# Patient Record
Sex: MALE | Race: WHITE | Employment: FULL TIME | URBAN - NONMETROPOLITAN AREA
[De-identification: names, ages, dates, MRNs, and addresses within clinical notes are randomized per-mention and may not be internally consistent; named-entity substitution may affect disease eponyms.]

---

## 2018-12-27 ENCOUNTER — OFFICE VISIT (OUTPATIENT)
Dept: FAMILY MEDICINE CLINIC | Age: 23
End: 2018-12-27
Payer: COMMERCIAL

## 2018-12-27 VITALS
BODY MASS INDEX: 24.87 KG/M2 | HEIGHT: 75 IN | DIASTOLIC BLOOD PRESSURE: 68 MMHG | RESPIRATION RATE: 14 BRPM | TEMPERATURE: 98.2 F | HEART RATE: 92 BPM | SYSTOLIC BLOOD PRESSURE: 122 MMHG | WEIGHT: 200 LBS

## 2018-12-27 DIAGNOSIS — R07.89 STERNAL PAIN: Primary | ICD-10-CM

## 2018-12-27 PROCEDURE — 99213 OFFICE O/P EST LOW 20 MIN: CPT | Performed by: FAMILY MEDICINE

## 2018-12-27 RX ORDER — PREDNISONE 10 MG/1
TABLET ORAL
Qty: 30 TABLET | Refills: 0 | Status: SHIPPED | OUTPATIENT
Start: 2018-12-27 | End: 2021-06-15

## 2018-12-27 ASSESSMENT — PATIENT HEALTH QUESTIONNAIRE - PHQ9
SUM OF ALL RESPONSES TO PHQ QUESTIONS 1-9: 0
SUM OF ALL RESPONSES TO PHQ9 QUESTIONS 1 & 2: 0
2. FEELING DOWN, DEPRESSED OR HOPELESS: 0
SUM OF ALL RESPONSES TO PHQ QUESTIONS 1-9: 0
1. LITTLE INTEREST OR PLEASURE IN DOING THINGS: 0

## 2018-12-27 NOTE — PROGRESS NOTES
lymphadenopathy  Pulmonary/Chest: clear to auscultation bilaterally- no wheezes, rales or rhonchi, normal air movement, no respiratory distress or retractions  Cardiovascular: normal rate, regular rhythm, normal S1 and S2, no murmurs, rubs, clicks, or gallops, distal pulses intact  Extremities: no cyanosis, clubbing or edema of the lower extremities  Psych:  Normal affect without evidence of depression oranxiety, insight and judgement are appropriate, memory appears intact  Skin: warm and dry, no rash or erythema    Physical Exam   Musculoskeletal:        Arms:      ASSESSMENT & PLAN  Yaw Escobar was seen today for other and chest pain. Diagnoses and all orders for this visit:    Sternal pain  -     predniSONE (DELTASONE) 10 MG tablet; Take 4 tabs PO x 3 days, then take 3 tabs PO x 3 days, then take 2 tabs PO x 3 days, then take 1 tab PO x 3 days  -     XR CHEST STANDARD (2 VW); Future    -Unclear etiology of the symptoms, will obtain XRay and treat as costochrondritis with prednisone. Will call in 2 weeks. If persisting, ?ortho referral.      Return if symptoms worsen or fail to improve. Controlled Substances Monitoring:                     Yaw Escobar received counseling on the following healthy behaviors: medication adherence  Reviewed prior labs and health maintenance. Continue current medications, diet and exercise. Discussed use, benefit, and side effects of prescribed medications. Barriers to medication compliance addressed. Patient given educational materials - see patient instructions. All patient questions answered. Patient voiced understanding.

## 2019-01-07 ENCOUNTER — TELEPHONE (OUTPATIENT)
Dept: FAMILY MEDICINE CLINIC | Age: 24
End: 2019-01-07

## 2019-01-07 ENCOUNTER — PATIENT MESSAGE (OUTPATIENT)
Dept: FAMILY MEDICINE CLINIC | Age: 24
End: 2019-01-07

## 2021-05-24 ENCOUNTER — OFFICE VISIT (OUTPATIENT)
Dept: FAMILY MEDICINE CLINIC | Age: 26
End: 2021-05-24
Payer: COMMERCIAL

## 2021-05-24 VITALS
TEMPERATURE: 97.3 F | HEART RATE: 78 BPM | DIASTOLIC BLOOD PRESSURE: 80 MMHG | BODY MASS INDEX: 24.87 KG/M2 | WEIGHT: 200 LBS | OXYGEN SATURATION: 98 % | RESPIRATION RATE: 18 BRPM | HEIGHT: 75 IN | SYSTOLIC BLOOD PRESSURE: 120 MMHG

## 2021-05-24 DIAGNOSIS — B07.8 OTHER VIRAL WARTS: ICD-10-CM

## 2021-05-24 DIAGNOSIS — N50.89 MASS OF RIGHT TESTICLE: Primary | ICD-10-CM

## 2021-05-24 PROCEDURE — G8419 CALC BMI OUT NRM PARAM NOF/U: HCPCS | Performed by: FAMILY MEDICINE

## 2021-05-24 PROCEDURE — G8427 DOCREV CUR MEDS BY ELIG CLIN: HCPCS | Performed by: FAMILY MEDICINE

## 2021-05-24 PROCEDURE — 99213 OFFICE O/P EST LOW 20 MIN: CPT | Performed by: FAMILY MEDICINE

## 2021-05-24 PROCEDURE — 17110 DESTRUCTION B9 LES UP TO 14: CPT | Performed by: FAMILY MEDICINE

## 2021-05-24 PROCEDURE — 1036F TOBACCO NON-USER: CPT | Performed by: FAMILY MEDICINE

## 2021-05-24 SDOH — ECONOMIC STABILITY: FOOD INSECURITY: WITHIN THE PAST 12 MONTHS, THE FOOD YOU BOUGHT JUST DIDN'T LAST AND YOU DIDN'T HAVE MONEY TO GET MORE.: NEVER TRUE

## 2021-05-24 SDOH — ECONOMIC STABILITY: FOOD INSECURITY: WITHIN THE PAST 12 MONTHS, YOU WORRIED THAT YOUR FOOD WOULD RUN OUT BEFORE YOU GOT MONEY TO BUY MORE.: NEVER TRUE

## 2021-05-24 ASSESSMENT — PATIENT HEALTH QUESTIONNAIRE - PHQ9
SUM OF ALL RESPONSES TO PHQ9 QUESTIONS 1 & 2: 0
SUM OF ALL RESPONSES TO PHQ QUESTIONS 1-9: 0
2. FEELING DOWN, DEPRESSED OR HOPELESS: 0
1. LITTLE INTEREST OR PLEASURE IN DOING THINGS: 0

## 2021-05-24 ASSESSMENT — SOCIAL DETERMINANTS OF HEALTH (SDOH): HOW HARD IS IT FOR YOU TO PAY FOR THE VERY BASICS LIKE FOOD, HOUSING, MEDICAL CARE, AND HEATING?: NOT HARD AT ALL

## 2021-05-24 NOTE — PROGRESS NOTES
Saba Theodore is a 22 y.o. male that presents for     Chief Complaint   Patient presents with    Mass     on rt testicle      Other     rt hand thimmb thick hard skin        /80   Pulse 78   Temp 97.3 °F (36.3 °C)   Resp 18   Ht 6' 3\" (1.905 m)   Wt 200 lb (90.7 kg)   SpO2 98%   BMI 25.00 kg/m²       HPI    Notes right testicular mass for the past month. No trauma. Has not noted any change in size. Non tender. No dysuria, pain with ejaculation. Skin Lesion    HPI:    Location - right medial thumb  Length of time it has been present - at least 1 year  Recent change in size, color or shape? - Yes - has gotten larger  Pruritic? No  Bleeding or drainage? No  Painful? Yes      Health Maintenance   Topic Date Due    Hepatitis C screen  Never done    Varicella vaccine (1 of 2 - 2-dose childhood series) Never done    HPV vaccine (1 - Male 2-dose series) Never done    COVID-19 Vaccine (1) Never done    HIV screen  Never done    DTaP/Tdap/Td vaccine (1 - Tdap) Never done    Flu vaccine (Season Ended) 09/01/2021    Hepatitis A vaccine  Aged Out    Hepatitis B vaccine  Aged Out    Hib vaccine  Aged Out    Meningococcal (ACWY) vaccine  Aged Out    Pneumococcal 0-64 years Vaccine  Aged Out       No past medical history on file. No past surgical history on file. Social History     Tobacco Use    Smoking status: Never Smoker    Smokeless tobacco: Never Used   Substance Use Topics    Alcohol use: No    Drug use: Not on file       No family history on file. I have reviewed the patient's past medical history, past surgical history, allergies, medications, social and family history and I have made updates where appropriate. Review of Systems        PHYSICAL EXAM:  /80   Pulse 78   Temp 97.3 °F (36.3 °C)   Resp 18   Ht 6' 3\" (1.905 m)   Wt 200 lb (90.7 kg)   SpO2 98%   BMI 25.00 kg/m²     Physical Exam  Nursing note reviewed.    Constitutional:       Appearance: He is well-developed. Pulmonary:      Effort: Pulmonary effort is normal. No respiratory distress. Genitourinary:     Testes:         Right: Mass (firm mass on the inferior right testicle, non tender) present. Skin:     Findings: Lesion (small ~5mm mass on the right medial thumb consistent with a cyst) present. Neurological:      Mental Status: He is alert. Psychiatric:         Behavior: Behavior normal.         Thought Content: Thought content normal.         Judgment: Judgment normal.       Wart Cryotherapy Note    Location:  Right thumb  Size: ~5mm    Written consent was obtain from patient or their guargian prior to procedure. After obtaining informed consent, the patient's identity, procedure, and site were verified during a pause prior to proceeding with the minor surgical procedure as per universal protocol recommendations. Wart was treated with light cryotherapy with freeze thaw freeze technique with 2-3 mm surround freeze of overlying keratin. Local care discussed. Side effects of treatment and precautions discussed. All questions answered. To follow up if worse or any new problems. Garett Melchor was seen today for mass and other. Diagnoses and all orders for this visit:    Mass of right testicle  -     US SCROTUM AND TESTICLES; Future        Return if symptoms worsen or fail to improve. The most recent results of the following tests were reviewed with the patient today: none     All copied or forwarded information in the progress note was verified by me to be accurate at the time of visit  Patient's past medical, surgical, social and family history were reviewed and updated     All patient questions answered. Patient voiced understanding.

## 2021-05-27 ENCOUNTER — HOSPITAL ENCOUNTER (OUTPATIENT)
Dept: ULTRASOUND IMAGING | Age: 26
Discharge: HOME | End: 2021-05-27
Payer: COMMERCIAL

## 2021-05-27 DIAGNOSIS — N50.89 MASS OF RIGHT TESTICLE: ICD-10-CM

## 2021-05-27 PROCEDURE — 76870 US EXAM SCROTUM: CPT

## 2021-05-28 DIAGNOSIS — N50.89 EPIDIDYMAL MASS: Primary | ICD-10-CM

## 2021-06-15 ENCOUNTER — OFFICE VISIT (OUTPATIENT)
Dept: UROLOGY | Age: 26
End: 2021-06-15
Payer: COMMERCIAL

## 2021-06-15 VITALS
BODY MASS INDEX: 24.92 KG/M2 | SYSTOLIC BLOOD PRESSURE: 124 MMHG | HEIGHT: 73 IN | DIASTOLIC BLOOD PRESSURE: 70 MMHG | WEIGHT: 188 LBS

## 2021-06-15 DIAGNOSIS — I86.1 LEFT VARICOCELE: Primary | ICD-10-CM

## 2021-06-15 DIAGNOSIS — N50.3 EPIDIDYMAL CYST: ICD-10-CM

## 2021-06-15 PROCEDURE — 99204 OFFICE O/P NEW MOD 45 MIN: CPT | Performed by: UROLOGY

## 2021-06-15 PROCEDURE — G8427 DOCREV CUR MEDS BY ELIG CLIN: HCPCS | Performed by: UROLOGY

## 2021-06-15 PROCEDURE — 4004F PT TOBACCO SCREEN RCVD TLK: CPT | Performed by: UROLOGY

## 2021-06-15 PROCEDURE — G8420 CALC BMI NORM PARAMETERS: HCPCS | Performed by: UROLOGY

## 2021-06-15 NOTE — PROGRESS NOTES
VALERIA Earl MD        [address] [place] [facility] [address]  Dept: [phone]  Dept Fax: [phone]: [address] [address] Urology Office Note -     Patient:  Ann Weir  YOB: 1995    The patient is a 32 y.o. male who presents today for evaluation of the following problems:   Chief Complaint   Patient presents with    Advice Only     new pt epidiymal cyst    referred/consultation requested by Roosevelt Baeza DO.    HISTORY OF PRESENT ILLNESS:     Varicocele  Left side  Painful  Atrophy of testes  Here to discuss options  Reviewed u/s      Requested/reviewed records from Roosevelt Baeza DO office and/or outside physician/EMR    (Patient's old records have been requested, reviewed and pertinent findings summarized in today's note.)    Procedures Today: N/A      Last several PSA's:  No results found for: PSA    Last total testosterone:  No results found for: TESTOSTERONE    Urinalysis today:  No results found for this visit on 06/15/21. Last BUN and creatinine:  No results found for: BUN  No results found for: CREATININE      Imaging Reviewed during this Office Visit:   Dian Mcmillan MD independently reviewed the images and verified the radiology reports from:    [address]    Result Date: 5/28/2021  PROCEDURE: US SCROTUM AND TESTICLES CLINICAL INFORMATION: Mass of right testicle . COMPARISON: No prior study. TECHNIQUE: Grayscale and color Doppler sonographic imaging of the scrotum was performed in the longitudinal and transverse planes. FINDINGS: Findings Right Testicle- 4.3 x 2.4 x 1.9 cm Right Epididymis- 0.8 x 1.7 x 1.2 cm Left Testicle - 3.7 x 2.1 x 1.8 cm Left Epididymis - 1.1 x 1.5 x 1.5 cm On the right side, the testis has normal echogenicity There is no testicular mass. There is normal color flow and Doppler waveforms. There is no hydrocele.  There is a 7.4 x 7.3 cm echogenic lesion in the body of the epididymis which represents the palpable abnormality. This may be secondary to chronic inflammatory disease. . On the left, the testicle has normal echogenicity. There is no testicular mass. There is normal color flow and Doppler waveforms. There is a varicocele present. The epididymis is normal.      1. Normal right and left testes. 2. 7.4 x 7.3 mm echogenic lesion in the body of the epididymis which represents the palpable abnormality. This may be secondary to chronic inflammatory disease. 3. Varicocele on the left side. 4. Otherwise negative ultrasound scan of the scrotum. **This report has been created using voice recognition software. It may contain minor errors which are inherent in voice recognition technology. ** Final report electronically signed by DR Stefani Davis on 5/28/2021 1:57 PM      PAST MEDICAL, FAMILY AND SOCIAL HISTORY:  History reviewed. No pertinent past medical history. Past Surgical History:   Procedure Laterality Date    WISDOM TOOTH EXTRACTION      as teenager      History reviewed. No pertinent family history. No outpatient medications have been marked as taking for the 6/15/21 encounter (Office Visit) with Nato Katz MD.       Milk-related compounds  Social History     Tobacco Use   Smoking Status Current Some Day Smoker    Types: Cigars   Smokeless Tobacco Never Used   Tobacco Comment    social      (If patient a smoker, smoking cessation counseling offered)   Social History     Substance and Sexual Activity   Alcohol Use No       REVIEW OF SYSTEMS:  Constitutional: negative  Eyes: negative  Respiratory: negative  Cardiovascular: negative  Gastrointestinal: negative  Genitourinary: see HPI  Musculoskeletal: negative  Skin: negative   Neurological: negative  Hematological/Lymphatic: negative  Psychological: negative      Physical Exam:    This a 32 y.o. male  Vitals:    06/15/21 1551   BP: 124/70     Body mass index is 24.8 kg/m².   Constitutional: Patient in no acute distress; Neuro: alert and oriented to person place and time. Psych: Mood and affect normal.  Skin: warm, dry  Lungs: Respiratory effort normal, Symmetric chest rise  Cardiovascular:  Normal peripheral pulses; Regular rate, radial pulses palpable  Abdomen: Soft, non-tender, non-distended with no CVA, flank pain, hepatosplenomegaly or hernia. Kidneys normal.  Bladder non-tender and not distended. Lymphatics: no palpable lymphadenopathy  Minimal/no edema in bilateral lower extremities  Large left varicocele w atrophy  Right epididymal cyst    Assessment and Plan        1. Left varicocele    2. Epididymal cyst               Plan:        Large left varicocele w atrophy  Right epididymal cyst  Needs varicocelectomy    Prescriptions Ordered:  No orders of the defined types were placed in this encounter. Orders Placed:  No orders of the defined types were placed in this encounter.            Yosef Hutchinson MD

## 2021-07-15 ENCOUNTER — OFFICE VISIT (OUTPATIENT)
Dept: UROLOGY | Age: 26
End: 2021-07-15
Payer: COMMERCIAL

## 2021-07-15 ENCOUNTER — TELEPHONE (OUTPATIENT)
Dept: UROLOGY | Age: 26
End: 2021-07-15

## 2021-07-15 VITALS — WEIGHT: 185 LBS | BODY MASS INDEX: 24.52 KG/M2 | HEIGHT: 73 IN

## 2021-07-15 DIAGNOSIS — I86.1 VARICOCELE: Primary | ICD-10-CM

## 2021-07-15 DIAGNOSIS — Z01.818 PRE-OP TESTING: ICD-10-CM

## 2021-07-15 PROCEDURE — G8420 CALC BMI NORM PARAMETERS: HCPCS | Performed by: UROLOGY

## 2021-07-15 PROCEDURE — 4004F PT TOBACCO SCREEN RCVD TLK: CPT | Performed by: UROLOGY

## 2021-07-15 PROCEDURE — G8427 DOCREV CUR MEDS BY ELIG CLIN: HCPCS | Performed by: UROLOGY

## 2021-07-15 PROCEDURE — 99214 OFFICE O/P EST MOD 30 MIN: CPT | Performed by: UROLOGY

## 2021-07-15 NOTE — TELEPHONE ENCOUNTER
DO NOT TAKE ASPIRIN, PLAVIX, FISH OIL, COUMADIN, IBUPROFEN, MOTRIN-LIKE DRUGS AND ANY MULTIVITAMINS OR OVER THE COUNTER SUPPLEMENTS 5 DAYS PRIOR TO SURGERY. Ann Weir 1995 Diagnosis:     Surgical Physician: Dr. Erasto Ritchie have been scheduled for the procedure marked below:      Surgery: Left Varicocelectomy         Date:  11/23/21    Anesthesia: Anesthesiologist (General/Spinal)     Place of Service: [address] [place] [address] Second Floor Same Day Surgery         Arrive to same day surgery by:  6:00 am  (Surgery time is subject to change)      INSTRUCTIONS AS MARKED BELOW:    1.  DO NOT eat or drink anything after midnight before surgery. 2.  We prefer you shower or bathe with an antibacterial soap (Dial) the morning of surgery. 3.  Please ensure to have a  with you to transport you home. 4.  Please bring a current medication list, photo ID and insurance card(s) with you  5. Okay to take Tylenol  6. If you take Glucophage, Metformin or Janumet, hold 48-hours prior to surgery  7. Take blood pressure or heart medication as directed, if taken in the morning take with a small sip of water  8 . The office will call you in 1-2 days after your procedure to schedule a follow up. DATE SENSITIVE TESTING    Do the pre op urine culture, chest xray and fasting labs on 11/9/21.  Orders included        Date: 7/15/2021

## 2021-07-15 NOTE — PROGRESS NOTES
Vanessa Newman MD   Urology Clinic Office visit      Patient:  Ashtyn Worrell  YOB: 1995  Date: 7/15/2021    HISTORY OF PRESENT ILLNESS:   The patient is a 32 y.o. male who presents today for follow-up for the following problem(s):      1. Varicocele           Overall the problem(s) : are worsening. Associated Symptoms: No dysuria, gross hematuria. Pain Severity:      Summary of old records: N/A    Additional History:   Patient interested in repair of varicocele; he is concerned for possibly fertility issues in future  Denies pain  Right sided spermatocele noted few months ago on self exam, noted on US    I independently reviewed and verified the images and reports from:    [address]    Result Date: 5/28/2021  PROCEDURE: US SCROTUM AND TESTICLES CLINICAL INFORMATION: Mass of right testicle . COMPARISON: No prior study. TECHNIQUE: Grayscale and color Doppler sonographic imaging of the scrotum was performed in the longitudinal and transverse planes. FINDINGS: Findings Right Testicle- 4.3 x 2.4 x 1.9 cm Right Epididymis- 0.8 x 1.7 x 1.2 cm Left Testicle - 3.7 x 2.1 x 1.8 cm Left Epididymis - 1.1 x 1.5 x 1.5 cm On the right side, the testis has normal echogenicity There is no testicular mass. There is normal color flow and Doppler waveforms. There is no hydrocele. There is a 7.4 x 7.3 cm echogenic lesion in the body of the epididymis which represents the palpable abnormality. This may be secondary to chronic inflammatory disease. . On the left, the testicle has normal echogenicity. There is no testicular mass. There is normal color flow and Doppler waveforms. There is a varicocele present. The epididymis is normal.      1. Normal right and left testes. 2. 7.4 x 7.3 mm echogenic lesion in the body of the epididymis which represents the palpable abnormality. This may be secondary to chronic inflammatory disease. 3. Varicocele on the left side.  4. Otherwise negative ultrasound scan of the scrotum. **This report has been created using voice recognition software. It may contain minor errors which are inherent in voice recognition technology. ** Final report electronically signed by DR Maura Arizmendi on 5/28/2021 1:57 PM        Last several PSA's:  No results found for: PSA    Last total testosterone:  No results found for: TESTOSTERONE    Urinalysis today:  No results found for this visit on 07/15/21. Last BUN and creatinine:  No results found for: BUN  No results found for: CREATININE    Imaging Reviewed during this Office Visit:   (results were independently reviewed by physician and radiology report verified)    [address]:  No past medical history on file. Past Surgical History:   Procedure Laterality Date    WISDOM TOOTH EXTRACTION      as teenager      No family history on file. No outpatient medications have been marked as taking for the 7/15/21 encounter (Office Visit) with Chris Schuler MD.       Milk-related compounds  Social History     Tobacco Use   Smoking Status Current Some Day Smoker    Types: Cigars   Smokeless Tobacco Never Used   Tobacco Comment    social       Social History     Substance and Sexual Activity   Alcohol Use No       REVIEW OF SYSTEMS:  Constitutional: negative  Eyes: negative  Respiratory: negative  Cardiovascular: negative  Gastrointestinal: negative  Musculoskeletal: negative  Genitourinary: negative except for what is in HPI  Skin: negative   Neurological: negative  Hematological/Lymphatic: negative  Psychological: negative    Physical Exam:    There were no vitals filed for this visit. Patient is a 32 y.o. male in no acute distress and alert and oriented to person, place and time. NAD, A/o  Non labored respiration  Normal peripheral pulses  Skin- warm and dry  Psych- normal mood and affect    Left grade 3 varicocele    Assessment and Plan      1.  Varicocele          Risks: I discussed all the risks, benefits, alternatives and possible complications or surgery as well as expectations and post-op recovery.     Nasim Evans M.D, MD Gayle Barman, M.D [facility]

## 2021-07-16 ENCOUNTER — TELEPHONE (OUTPATIENT)
Dept: UROLOGY | Age: 26
End: 2021-07-16

## 2021-07-16 NOTE — TELEPHONE ENCOUNTER
Patient scheduled for surgery with Dr Irma Awan on 8/12/21. Surgery consent on arrival. Patient to do pre op urine culture,fasting labs and chest xray on 7/29/21. Surgery instructions given to the patient.

## 2021-07-16 NOTE — TELEPHONE ENCOUNTER
SURGERY [address]  [address] [place], [place] [address] Drive [facility], [address]      Phone *[phone] *[phone]   Surgical Scheduling Direct Line Phone *[phone] Fax *[phone]      Toshia rEwin 1995 male    [address] [facility] [address]   Marital Status: Single         Home Phone: [phone]      Cell Phone:    Telephone Information:   Mobile [phone]          Surgeon: Dr. Michelle Duran  Surgery Date: 11/23/21   Time: 7:30 am    Procedure: Left Varicocelectomy    Diagnosis: Left Varicocele     Important Medical History: In [facility]    Special Inst/Equip: Regular Room    CPT Codes:    36368  Latex Allergy:  No     Cardiac Device:  No     Anesthesia:  Anesthesiologist (General/Spinal)          Admission Type:  Same Day                             Admit Prior to Day of Surgery: No    Case Location:  Main OR           Preadmission Testing:Phone Call              PAT Date and Time:______________________________________________________    PAT Confirmation #: ______________________________________________________    Post Op Visit: ___________________________________________________________    Need Preop Cardiac Clearance: No    Does Patient have Cardiologist/physician?      None    Surgery Confirmation #: __________________________________________________    : ________________________   Date: __________________________     [employer] Name: [facility]

## 2021-07-16 NOTE — TELEPHONE ENCOUNTER
No pre cert needed for CPT 90340 with Dx Code I86.1 per [facility] with [facility]   Ref # [id number]

## 2021-10-07 ENCOUNTER — TELEPHONE (OUTPATIENT)
Dept: UROLOGY | Age: 26
End: 2021-10-07

## 2021-10-07 DIAGNOSIS — I86.1 VARICOCELE: Primary | ICD-10-CM

## 2021-10-07 DIAGNOSIS — Z01.818 PRE-OP TESTING: ICD-10-CM

## 2021-10-07 NOTE — TELEPHONE ENCOUNTER
SURGERY [address] [place] [address] [facility] [address] [place], [address]                            Phone *[phone] *[phone]              Surgical Scheduling Direct Line Phone *[phone] Fax *[phone]        Ed Rahman        1995          male     [address]  [address] [address]              Marital Status: Single                                                     Home Phone: [phone]      Cell Phone:    Telephone Information:   Mobile [phone]                                            Surgeon: Dr. Brandon Sauceda            Surgery Date:     12/9/21                  Time: 11:00 am     Procedure: Left Varicocelectomy     Diagnosis: Left Varicocele      Important Medical History: In [facility]     Special Inst/Equip: Regular Room     CPT Codes:    70512  Latex Allergy:  No     Cardiac Device:  No      Anesthesia:    Anesthesiologist (General/Spinal)                       Admission Type:  Same Day                             Admit Prior to Day of Surgery: No     Case Location:  Main OR                Preadmission Testing:Phone Call                           PAT Date and Time:______________________________________________________     PAT Confirmation #: ______________________________________________________     Post Op Visit: ___________________________________________________________     Need Preop Cardiac Clearance: No     Does Patient have Cardiologist/physician?      None     Surgery Confirmation #: __________________________________________________     : ________________________   Date: __________________________      Insurance Company Name: [facility]

## 2021-10-07 NOTE — TELEPHONE ENCOUNTER
Patient's surgery rescheduled to 12/9/21 with Dr Alice Bhandari. Surgery consent on arrival. Patient to do pre op urine culture, fasting labs and chest xray on 11/24/21. Surgery instructions mailed to the patient.

## 2021-10-07 NOTE — TELEPHONE ENCOUNTER
DO NOT TAKE ASPIRIN, PLAVIX, FISH OIL, COUMADIN, IBUPROFEN, MOTRIN-LIKE DRUGS AND ANY MULTIVITAMINS OR OVER THE COUNTER SUPPLEMENTS 5 DAYS PRIOR TO SURGERY.                Consuelo Rojas        1995          Diagnosis:      Surgical Physician: Dr. Harini Connell have been scheduled for the procedure marked below:                  Surgery: Left Varicocelectomy                                          Date:   12/9/21     Anesthesia: Anesthesiologist ([address] [place] [address])               Place of Service: [facility] Second Floor Same Day Surgery                                                                          Arrive to same day surgery by: 9:00 am  (Surgery time is subject to change)                             INSTRUCTIONS AS MARKED BELOW:     1.  DO NOT eat or drink anything after midnight before surgery. 2.  We prefer you shower or bathe with an antibacterial soap (Dial) the morning of surgery. 3.  Please ensure to have a  with you to transport you home. 4.  Please bring a current medication list, photo ID and insurance card(s) with you  5. Okay to take Tylenol  6. If you take Glucophage, Metformin or Janumet, hold 48-hours prior to surgery  7. Take blood pressure or heart medication as directed, if taken in the morning take with a small sip of water  8 . The office will call you in 1-2 days after your procedure to schedule a follow up.                                         DATE SENSITIVE TESTING     Do the pre op urine culture, chest xray and fasting labs on 11/24/21.  Orders included

## 2021-10-26 ENCOUNTER — TELEPHONE (OUTPATIENT)
Dept: UROLOGY | Age: 26
End: 2021-10-26

## 2021-11-04 NOTE — TELEPHONE ENCOUNTER
DO NOT TAKE ASPIRIN, PLAVIX, FISH OIL, COUMADIN, IBUPROFEN, MOTRIN-LIKE DRUGS AND ANY MULTIVITAMINS OR OVER THE COUNTER SUPPLEMENTS 5 DAYS PRIOR TO SURGERY.                Yulia House        1995          Diagnosis:      Surgical Physician: Dr. Leroy Aaron have been scheduled for the procedure marked below:                  Surgery: Left Varicocelectomy                                          Date:   1/13/2022     Anesthesia: Anesthesiologist ([facility])               Place of Service: [address] [place] [address] [address] Second Floor Same Day Surgery                                                                            Arrive to same day surgery by:  6:00 am  (Surgery time is subject to change)                             INSTRUCTIONS AS MARKED BELOW:     1.  DO NOT eat or drink anything after midnight before surgery. 2.  We prefer you shower or bathe with an antibacterial soap (Dial) the morning of surgery. 3.  Please ensure to have a  with you to transport you home. 4.  Please bring a current medication list, photo ID and insurance card(s) with you  5. Okay to take Tylenol  6. If you take Glucophage, Metformin or Janumet, hold 48-hours prior to surgery  7. Take blood pressure or heart medication as directed, if taken in the morning take with a small sip of water  8 . The office will call you in 1-2 days after your procedure to schedule a follow up.                                         DATE SENSITIVE TESTING     Do the pre op urine culture, chest xray and fasting labs on 12/30/21.  Orders given

## 2021-11-04 NOTE — TELEPHONE ENCOUNTER
SURGERY [address] [facility] [address] [facility] [address] [place], [address]                            Phone *[phone] *[phone]              Surgical Scheduling Direct Line Phone *[phone] Fax *[phone]        Jalyn De Jesus        1995          male     [address]  [address] [facility]              Marital Status: Single                                                     Home Phone: [phone]      Cell Phone:    Telephone Information:   Mobile [phone]                                            Surgeon: Dr. Elizabeth Hull            Surgery Date: 1/13/2022                      Time: 7:30 am     Procedure: Left Varicocelectomy     Diagnosis: Left Varicocele      Important Medical History: In [facility]     Special Inst/Equip: Regular Room     CPT Codes:    60868  Latex Allergy:  No     Cardiac Device:  No      Anesthesia:    Anesthesiologist (General/Spinal)                       Admission Type:  Same Day                             Admit Prior to Day of Surgery: No     Case Location:  Main OR                Preadmission Testing:Phone Call                           PAT Date and Time:______________________________________________________     PAT Confirmation #: ______________________________________________________     Post Op Visit: ___________________________________________________________     Need Preop Cardiac Clearance: No     Does Patient have Cardiologist/physician?      None     Surgery Confirmation #: __________________________________________________     : ________________________   Date: __________________________      Insurance Company Name: [facility]

## 2021-12-16 ENCOUNTER — PREP FOR PROCEDURE (OUTPATIENT)
Dept: UROLOGY | Age: 26
End: 2021-12-16

## 2021-12-16 RX ORDER — SODIUM CHLORIDE 9 MG/ML
INJECTION, SOLUTION INTRAVENOUS CONTINUOUS
Status: CANCELLED | OUTPATIENT
Start: 2022-01-13

## 2021-12-30 ENCOUNTER — HOSPITAL ENCOUNTER (OUTPATIENT)
Dept: GENERAL RADIOLOGY | Age: 26
Discharge: HOME | End: 2021-12-30
Payer: COMMERCIAL

## 2021-12-30 ENCOUNTER — HOSPITAL ENCOUNTER (OUTPATIENT)
Age: 26
Discharge: HOME | End: 2021-12-30
Payer: COMMERCIAL

## 2021-12-30 DIAGNOSIS — I86.1 VARICOCELE: ICD-10-CM

## 2021-12-30 DIAGNOSIS — Z01.818 PRE-OP TESTING: ICD-10-CM

## 2021-12-30 LAB
ANION GAP SERPL CALCULATED.3IONS-SCNC: 10 MEQ/L (ref 8–16)
BASOPHILS # BLD: 1.1 %
BASOPHILS ABSOLUTE: 0.1 THOU/MM3 (ref 0–0.1)
BUN BLDV-MCNC: 12 MG/DL (ref 7–22)
CALCIUM SERPL-MCNC: 9.5 MG/DL (ref 8.5–10.5)
CHLORIDE BLD-SCNC: 103 MEQ/L (ref 98–111)
CO2: 26 MEQ/L (ref 23–33)
CREAT SERPL-MCNC: 1.1 MG/DL (ref 0.4–1.2)
EOSINOPHIL # BLD: 15.2 %
EOSINOPHILS ABSOLUTE: 1 THOU/MM3 (ref 0–0.4)
ERYTHROCYTE [DISTWIDTH] IN BLOOD BY AUTOMATED COUNT: 12.2 % (ref 11.5–14.5)
ERYTHROCYTE [DISTWIDTH] IN BLOOD BY AUTOMATED COUNT: 39 FL (ref 35–45)
GFR SERPL CREATININE-BSD FRML MDRD: 81 ML/MIN/1.73M2
GLUCOSE BLD-MCNC: 91 MG/DL (ref 70–108)
HCT VFR BLD CALC: 45.8 % (ref 42–52)
HEMOGLOBIN: 15.5 GM/DL (ref 14–18)
IMMATURE GRANS (ABS): 0.02 THOU/MM3 (ref 0–0.07)
IMMATURE GRANULOCYTES: 0.3 %
LYMPHOCYTES # BLD: 11.5 %
LYMPHOCYTES ABSOLUTE: 0.7 THOU/MM3 (ref 1–4.8)
MCH RBC QN AUTO: 29.6 PG (ref 26–33)
MCHC RBC AUTO-ENTMCNC: 33.8 GM/DL (ref 32.2–35.5)
MCV RBC AUTO: 87.6 FL (ref 80–94)
MONOCYTES # BLD: 9.8 %
MONOCYTES ABSOLUTE: 0.6 THOU/MM3 (ref 0.4–1.3)
NUCLEATED RED BLOOD CELLS: 0 /100 WBC
PLATELET # BLD: 225 THOU/MM3 (ref 130–400)
PMV BLD AUTO: 9.3 FL (ref 9.4–12.4)
POTASSIUM SERPL-SCNC: 4 MEQ/L (ref 3.5–5.2)
RBC # BLD: 5.23 MILL/MM3 (ref 4.7–6.1)
SEG NEUTROPHILS: 62.1 %
SEGMENTED NEUTROPHILS ABSOLUTE COUNT: 4 THOU/MM3 (ref 1.8–7.7)
SODIUM BLD-SCNC: 139 MEQ/L (ref 135–145)
WBC # BLD: 6.5 THOU/MM3 (ref 4.8–10.8)

## 2021-12-30 PROCEDURE — 71046 X-RAY EXAM CHEST 2 VIEWS: CPT

## 2021-12-30 PROCEDURE — 85025 COMPLETE CBC W/AUTO DIFF WBC: CPT

## 2021-12-30 PROCEDURE — 80048 BASIC METABOLIC PNL TOTAL CA: CPT

## 2021-12-30 PROCEDURE — 36415 COLL VENOUS BLD VENIPUNCTURE: CPT

## 2021-12-30 PROCEDURE — 87086 URINE CULTURE/COLONY COUNT: CPT

## 2022-01-01 LAB
ORGANISM: ABNORMAL
URINE CULTURE, ROUTINE: ABNORMAL

## 2022-01-03 ENCOUNTER — TELEPHONE (OUTPATIENT)
Dept: UROLOGY | Age: 27
End: 2022-01-03

## 2022-01-06 NOTE — PROGRESS NOTES
In preparation for their surgical procedure above patient was screened for Obstructive Sleep Apnea (HAMLET) using the STOP-Bang Questionnaire by the Pre-Admission Testing department. This is a pre-surgical screening tool for patient safety and serves as a recommendation, this WILL NOT cause cancellation of surgery. STOP-Bang Questionnaire  * Do you currently see a pulmonologist?  No     If yes STOP, do not complete. Patient follows with  .    1. Do you snore loudly (able to be heard in the next room)? No    2. Do you often feel tired or sleepy during the daytime? No       3. Has anyone ever told you that you stop breathing during your sleep? No    4. Do you have or are you being treated for high blood pressure? No      5. BMI more than 35? BMI (Calculated): 24.5        No    6. Age over 48 years? 32 y.o. No    7. Neck Circumference greater than 17 inches for male or 16 inches for female? Measured           (visits only)            Not Applicable    8. Gender Male? Yes      TOTAL SCORE: 1    HAMLET - Low Risk : Yes to 0 - 2 questions  HAMLET - Intermediate Risk : Yes to 3 - 4 questions  HAMLET - High Risk : Yes to 5 - 8 questions    Adapted from:   STOP Questionnaire: A Tool to Screen Patients for Obstructive Sleep Apnea   SHELLIE Ortiz.P.C., SHAYY Heck.B.B.S., Kieran Thacker M.D., Judi Zurita. Tyson Alonso, Ph.D., SHAYY Chua.B.B.S., Anaya Rossi M.Sc., Pepe Nash M.D., Dhruv Wesley. SHELLIE Costello.P.C.    Anesthesiology 2008; 859:044-73 Copyright 2008, the [address] of [employer]est[employer], [place].   ----------------------------------------------------------------------------------------------------------------

## 2022-01-06 NOTE — PROGRESS NOTES
Following instructions given to patient, who states understanding:     NPO after midnight  [employer] and 's license  Wear comfortable clean clothing  Do not bring jewelry   Shower night before and morning of surgery with a liquid antibacterial soap  Bring medications in original bottles  Follow all instructions given by your physician   needed at discharge  Call [phone] for any questions  Report to SDS on 2nd floor  If you would become ill prior to surgery, please call the surgeon    Please bring and wear mask

## 2022-01-13 ENCOUNTER — ANESTHESIA (OUTPATIENT)
Dept: OPERATING ROOM | Age: 27
End: 2022-01-13
Payer: COMMERCIAL

## 2022-01-13 ENCOUNTER — HOSPITAL ENCOUNTER (OUTPATIENT)
Age: 27
Setting detail: OUTPATIENT SURGERY
Discharge: HOME | End: 2022-01-13
Attending: UROLOGY | Admitting: UROLOGY
Payer: COMMERCIAL

## 2022-01-13 ENCOUNTER — ANESTHESIA EVENT (OUTPATIENT)
Dept: OPERATING ROOM | Age: 27
End: 2022-01-13
Payer: COMMERCIAL

## 2022-01-13 VITALS
TEMPERATURE: 96.7 F | RESPIRATION RATE: 18 BRPM | DIASTOLIC BLOOD PRESSURE: 74 MMHG | BODY MASS INDEX: 24.84 KG/M2 | HEART RATE: 81 BPM | SYSTOLIC BLOOD PRESSURE: 121 MMHG | WEIGHT: 187.4 LBS | HEIGHT: 73 IN | OXYGEN SATURATION: 99 %

## 2022-01-13 VITALS
RESPIRATION RATE: 13 BRPM | DIASTOLIC BLOOD PRESSURE: 57 MMHG | TEMPERATURE: 96.8 F | OXYGEN SATURATION: 100 % | SYSTOLIC BLOOD PRESSURE: 98 MMHG

## 2022-01-13 DIAGNOSIS — L76.82 INCISIONAL PAIN: Primary | ICD-10-CM

## 2022-01-13 PROCEDURE — 7100000000 HC PACU RECOVERY - FIRST 15 MIN: Performed by: UROLOGY

## 2022-01-13 PROCEDURE — 6360000002 HC RX W HCPCS: Performed by: UROLOGY

## 2022-01-13 PROCEDURE — 7100000011 HC PHASE II RECOVERY - ADDTL 15 MIN: Performed by: UROLOGY

## 2022-01-13 PROCEDURE — 6360000002 HC RX W HCPCS: Performed by: NURSE ANESTHETIST, CERTIFIED REGISTERED

## 2022-01-13 PROCEDURE — 2580000003 HC RX 258: Performed by: UROLOGY

## 2022-01-13 PROCEDURE — 3700000000 HC ANESTHESIA ATTENDED CARE: Performed by: UROLOGY

## 2022-01-13 PROCEDURE — 3600000012 HC SURGERY LEVEL 2 ADDTL 15MIN: Performed by: UROLOGY

## 2022-01-13 PROCEDURE — 2709999900 HC NON-CHARGEABLE SUPPLY: Performed by: UROLOGY

## 2022-01-13 PROCEDURE — 7100000001 HC PACU RECOVERY - ADDTL 15 MIN: Performed by: UROLOGY

## 2022-01-13 PROCEDURE — 7100000010 HC PHASE II RECOVERY - FIRST 15 MIN: Performed by: UROLOGY

## 2022-01-13 PROCEDURE — 3700000001 HC ADD 15 MINUTES (ANESTHESIA): Performed by: UROLOGY

## 2022-01-13 PROCEDURE — 2500000003 HC RX 250 WO HCPCS: Performed by: UROLOGY

## 2022-01-13 PROCEDURE — 3600000002 HC SURGERY LEVEL 2 BASE: Performed by: UROLOGY

## 2022-01-13 RX ORDER — LABETALOL 20 MG/4 ML (5 MG/ML) INTRAVENOUS SYRINGE
5 EVERY 10 MIN PRN
Status: DISCONTINUED | OUTPATIENT
Start: 2022-01-13 | End: 2022-01-13 | Stop reason: HOSPADM

## 2022-01-13 RX ORDER — SODIUM CHLORIDE 9 MG/ML
INJECTION, SOLUTION INTRAVENOUS CONTINUOUS
Status: DISCONTINUED | OUTPATIENT
Start: 2022-01-13 | End: 2022-01-13 | Stop reason: HOSPADM

## 2022-01-13 RX ORDER — FENTANYL CITRATE 50 UG/ML
50 INJECTION, SOLUTION INTRAMUSCULAR; INTRAVENOUS EVERY 5 MIN PRN
Status: DISCONTINUED | OUTPATIENT
Start: 2022-01-13 | End: 2022-01-13 | Stop reason: HOSPADM

## 2022-01-13 RX ORDER — OXYCODONE HYDROCHLORIDE AND ACETAMINOPHEN 5; 325 MG/1; MG/1
1 TABLET ORAL EVERY 4 HOURS PRN
Qty: 28 TABLET | Refills: 0 | Status: SHIPPED | OUTPATIENT
Start: 2022-01-13 | End: 2022-01-20

## 2022-01-13 RX ORDER — DOXYCYCLINE HYCLATE 100 MG
100 TABLET ORAL 2 TIMES DAILY
Qty: 14 TABLET | Refills: 0 | Status: SHIPPED | OUTPATIENT
Start: 2022-01-13 | End: 2022-01-20

## 2022-01-13 RX ORDER — PROPOFOL 10 MG/ML
INJECTION, EMULSION INTRAVENOUS PRN
Status: DISCONTINUED | OUTPATIENT
Start: 2022-01-13 | End: 2022-01-13 | Stop reason: SDUPTHER

## 2022-01-13 RX ORDER — MIDAZOLAM HYDROCHLORIDE 1 MG/ML
INJECTION INTRAMUSCULAR; INTRAVENOUS PRN
Status: DISCONTINUED | OUTPATIENT
Start: 2022-01-13 | End: 2022-01-13 | Stop reason: SDUPTHER

## 2022-01-13 RX ORDER — FENTANYL CITRATE 50 UG/ML
25 INJECTION, SOLUTION INTRAMUSCULAR; INTRAVENOUS EVERY 5 MIN PRN
Status: DISCONTINUED | OUTPATIENT
Start: 2022-01-13 | End: 2022-01-13 | Stop reason: HOSPADM

## 2022-01-13 RX ORDER — MEPERIDINE HYDROCHLORIDE 25 MG/ML
12.5 INJECTION INTRAMUSCULAR; INTRAVENOUS; SUBCUTANEOUS EVERY 5 MIN PRN
Status: DISCONTINUED | OUTPATIENT
Start: 2022-01-13 | End: 2022-01-13 | Stop reason: HOSPADM

## 2022-01-13 RX ORDER — ONDANSETRON 2 MG/ML
4 INJECTION INTRAMUSCULAR; INTRAVENOUS
Status: DISCONTINUED | OUTPATIENT
Start: 2022-01-13 | End: 2022-01-13 | Stop reason: HOSPADM

## 2022-01-13 RX ORDER — CEFAZOLIN SODIUM 2 G/100ML
INJECTION, SOLUTION INTRAVENOUS
Status: DISCONTINUED
Start: 2022-01-13 | End: 2022-01-13 | Stop reason: HOSPADM

## 2022-01-13 RX ORDER — FENTANYL CITRATE 50 UG/ML
INJECTION, SOLUTION INTRAMUSCULAR; INTRAVENOUS PRN
Status: DISCONTINUED | OUTPATIENT
Start: 2022-01-13 | End: 2022-01-13 | Stop reason: SDUPTHER

## 2022-01-13 RX ORDER — DEXAMETHASONE SODIUM PHOSPHATE 10 MG/ML
INJECTION, EMULSION INTRAMUSCULAR; INTRAVENOUS PRN
Status: DISCONTINUED | OUTPATIENT
Start: 2022-01-13 | End: 2022-01-13 | Stop reason: SDUPTHER

## 2022-01-13 RX ORDER — ONDANSETRON 2 MG/ML
INJECTION INTRAMUSCULAR; INTRAVENOUS PRN
Status: DISCONTINUED | OUTPATIENT
Start: 2022-01-13 | End: 2022-01-13 | Stop reason: SDUPTHER

## 2022-01-13 RX ORDER — BUPIVACAINE HYDROCHLORIDE 5 MG/ML
INJECTION, SOLUTION EPIDURAL; INTRACAUDAL PRN
Status: DISCONTINUED | OUTPATIENT
Start: 2022-01-13 | End: 2022-01-13 | Stop reason: ALTCHOICE

## 2022-01-13 RX ORDER — PROMETHAZINE HYDROCHLORIDE 25 MG/ML
6.25 INJECTION, SOLUTION INTRAMUSCULAR; INTRAVENOUS
Status: DISCONTINUED | OUTPATIENT
Start: 2022-01-13 | End: 2022-01-13 | Stop reason: HOSPADM

## 2022-01-13 RX ADMIN — MIDAZOLAM 2 MG: 1 INJECTION INTRAMUSCULAR; INTRAVENOUS at 07:48

## 2022-01-13 RX ADMIN — Medication 100 MG: at 07:51

## 2022-01-13 RX ADMIN — SODIUM CHLORIDE: 9 INJECTION, SOLUTION INTRAVENOUS at 06:45

## 2022-01-13 RX ADMIN — FENTANYL CITRATE 50 MCG: 50 INJECTION, SOLUTION INTRAMUSCULAR; INTRAVENOUS at 08:09

## 2022-01-13 RX ADMIN — CEFAZOLIN SODIUM 2000 MG: 10 INJECTION, POWDER, FOR SOLUTION INTRAVENOUS at 07:56

## 2022-01-13 RX ADMIN — ONDANSETRON 4 MG: 2 INJECTION INTRAMUSCULAR; INTRAVENOUS at 08:39

## 2022-01-13 RX ADMIN — FENTANYL CITRATE 50 MCG: 50 INJECTION, SOLUTION INTRAMUSCULAR; INTRAVENOUS at 08:05

## 2022-01-13 RX ADMIN — PROPOFOL 200 MG: 10 INJECTION, EMULSION INTRAVENOUS at 07:51

## 2022-01-13 RX ADMIN — FENTANYL CITRATE 100 MCG: 50 INJECTION, SOLUTION INTRAMUSCULAR; INTRAVENOUS at 07:57

## 2022-01-13 RX ADMIN — DEXAMETHASONE SODIUM PHOSPHATE 10 MG: 10 INJECTION, EMULSION INTRAMUSCULAR; INTRAVENOUS at 07:56

## 2022-01-13 ASSESSMENT — PULMONARY FUNCTION TESTS
PIF_VALUE: 3
PIF_VALUE: 3
PIF_VALUE: 2
PIF_VALUE: 9
PIF_VALUE: 3
PIF_VALUE: 9
PIF_VALUE: 2
PIF_VALUE: 2
PIF_VALUE: 9
PIF_VALUE: 30
PIF_VALUE: 9
PIF_VALUE: 3
PIF_VALUE: 3
PIF_VALUE: 10
PIF_VALUE: 9
PIF_VALUE: 4
PIF_VALUE: 9
PIF_VALUE: 27
PIF_VALUE: 9
PIF_VALUE: 10
PIF_VALUE: 3
PIF_VALUE: 3
PIF_VALUE: 2
PIF_VALUE: 9
PIF_VALUE: 18
PIF_VALUE: 9
PIF_VALUE: 9
PIF_VALUE: 3
PIF_VALUE: 2
PIF_VALUE: 3
PIF_VALUE: 3
PIF_VALUE: 0
PIF_VALUE: 3
PIF_VALUE: 9
PIF_VALUE: 3
PIF_VALUE: 9
PIF_VALUE: 24
PIF_VALUE: 2
PIF_VALUE: 3
PIF_VALUE: 13
PIF_VALUE: 2
PIF_VALUE: 1
PIF_VALUE: 3
PIF_VALUE: 9
PIF_VALUE: 3
PIF_VALUE: 9
PIF_VALUE: 9
PIF_VALUE: 0
PIF_VALUE: 9

## 2022-01-13 ASSESSMENT — PAIN SCALES - GENERAL: PAINLEVEL_OUTOF10: 2

## 2022-01-13 ASSESSMENT — PAIN - FUNCTIONAL ASSESSMENT: PAIN_FUNCTIONAL_ASSESSMENT: 0-10

## 2022-01-13 NOTE — ANESTHESIA POSTPROCEDURE EVALUATION
Department of Anesthesiology  Postprocedure Note    Patient: José Miguel Yu  MRN: [id number]  YOB: 1995  Date of evaluation: 1/13/2022  Time:  9:33 AM     Procedure Summary     Date: 01/13/22 Room / Location: [facility]    Anesthesia Start: 0072 Anesthesia Stop: 0315    Procedure: LEFT VARICOCELECTOMY (Left ) Diagnosis: (LEFT VARICOCELE)    Surgeons: Jannette Angel MD Responsible Provider: Sherry Campuzano MD    Anesthesia Type: general ASA Status: 1          Anesthesia Type: general    Elise Phase I: Elise Score: 10    Elise Phase II: Elise Score: 10    Last vitals: Reviewed and per EMR flowsheets.        Anesthesia Post Evaluation    Patient location during evaluation: PACU  Patient participation: complete - patient participated  Level of consciousness: awake and alert  Airway patency: patent  Nausea & Vomiting: no nausea  Complications: no  Cardiovascular status: blood pressure returned to baseline and hemodynamically stable  Respiratory status: acceptable and spontaneous ventilation  Hydration status: euvolemic

## 2022-01-13 NOTE — PROGRESS NOTES
Pt has met discharge criteria and states he is ready for discharge to home. IV removed, gauze and tape applied. Dressed in own clothes and personal belongings gathered. Discharge instructions (with opioid medication education information) given to pt and family; pt and family verbalized understanding of discharge instructions, prescriptions and follow up appointments. Pt transported to discharge lobby by [facility] staff.

## 2022-01-13 NOTE — ANESTHESIA PRE PROCEDURE
Department of Anesthesiology  Preprocedure Note       Name:  Alena Pinzon   Age:  32 y.o.  :  1995                                          MRN:  [id number]         Date:  2022      Surgeon: Montez Downs):  Parveen Sebastian MD    Procedure: Procedure(s):  LEFT VARICOCELECTOMY    Medications prior to admission:   Prior to Admission medications    Not on File       Current medications:    Current Facility-Administered Medications   Medication Dose Route Frequency Provider Last Rate Last Admin    0.9 % sodium chloride infusion   IntraVENous Continuous Parveen Sebastian  mL/hr at 22 0645 New Bag at 22 0645    ceFAZolin (ANCEF) 2000 mg in dextrose 5 % 50 mL IVPB  2,000 mg IntraVENous MD Yanet           Allergies: Allergies   Allergen Reactions    Milk-Related Compounds Other (See Comments)     Itchy throat        Problem List:    Patient Active Problem List   Diagnosis Code    Mononucleosis B27.90       Past Medical History:  History reviewed. No pertinent past medical history.     Past Surgical History:        Procedure Laterality Date    WISDOM TOOTH EXTRACTION      as teenager        Social History:    Social History     Tobacco Use    Smoking status: Current Some Day Smoker     Types: Cigars    Smokeless tobacco: Never Used    Tobacco comment: social   Substance Use Topics    Alcohol use: Not Currently                                Ready to quit: Not Answered  Counseling given: Not Answered  Comment: social      Vital Signs (Current):   Vitals:    22 1504 22 0616 2218   BP:  126/70    Pulse:  81    Resp:  16    Temp:  98.3 °F (36.8 °C)    TempSrc:  Temporal    SpO2:  98%    Weight: 185 lb (83.9 kg)  187 lb 6.4 oz (85 kg)   Height: 6' 1\" (1.854 m)  6' 1\" (1.854 m)                                              BP Readings from Last 3 Encounters:   22 126/70   06/15/21 124/70   21 120/80       NPO Status: Time of last liquid consumption: 2100 (sip of water at 0300 this am)                        Time of last solid consumption: 2100                        Date of last liquid consumption: 01/12/22                        Date of last solid food consumption: 01/12/22    BMI:   Wt Readings from Last 3 Encounters:   01/13/22 187 lb 6.4 oz (85 kg)   07/15/21 185 lb (83.9 kg)   06/15/21 188 lb (85.3 kg)     Body mass index is 24.72 kg/m². CBC:   Lab Results   Component Value Date    WBC 6.5 12/30/2021    RBC 5.23 12/30/2021    HGB 15.5 12/30/2021    HCT 45.8 12/30/2021    MCV 87.6 12/30/2021     12/30/2021       CMP:   Lab Results   Component Value Date     12/30/2021    K 4.0 12/30/2021     12/30/2021    CO2 26 12/30/2021    BUN 12 12/30/2021    CREATININE 1.1 12/30/2021    LABGLOM 81 12/30/2021    GLUCOSE 91 12/30/2021    CALCIUM 9.5 12/30/2021       POC Tests: No results for input(s): POCGLU, POCNA, POCK, POCCL, POCBUN, POCHEMO, POCHCT in the last 72 hours. Coags: No results found for: PROTIME, INR, APTT    HCG (If Applicable): No results found for: PREGTESTUR, PREGSERUM, HCG, HCGQUANT     ABGs: No results found for: PHART, PO2ART, GQI0SHS, BYD0CYY, BEART, M9BITPZG     Type & Screen (If Applicable):  No results found for: LABABO, LABRH    Drug/Infectious Status (If Applicable):  No results found for: HIV, HEPCAB    COVID-19 Screening (If Applicable): No results found for: COVID19        Anesthesia Evaluation   no history of anesthetic complications:   Airway: Mallampati: II  TM distance: >3 FB   Neck ROM: full  Mouth opening: > = 3 FB Dental:          Pulmonary:normal exam              Patient did not smoke on day of surgery.                  Cardiovascular:  Exercise tolerance: good (>4 METS),                     Neuro/Psych:   Negative Neuro/Psych ROS              GI/Hepatic/Renal: Neg GI/Hepatic/Renal ROS            Endo/Other: Negative Endo/Other ROS             Pt had no PAT visit       Abdominal: Vascular: negative vascular ROS. Other Findings:             Anesthesia Plan      general     ASA 1       Induction: intravenous. MIPS: Postoperative opioids intended and Prophylactic antiemetics administered. Anesthetic plan and risks discussed with patient. Plan discussed with CRNA.                   Diogenes Rivera MD   1/13/2022

## 2022-01-13 NOTE — PROGRESS NOTES
Pt admitted to [facility] room 5 and oriented to unit. SCD sleeves applied. Nares swabbed. Pt verbalized permission for first name, last initial and physicians name on white board. SDS board and discharge criteria explained, pt and family verbalized understanding. Pt denies thoughts of harming self or others. Call light in reach. Family at the bedside.

## 2022-01-13 NOTE — FLOWSHEET NOTE
Pt returned to [facility] room 5. Vitals and assessment as charted. 0.9 infusing, @200ml +new bag to count from PACU. Pt has crackers and Pepsi. Family at the bedside. Pt and family verbalized understanding of discharge criteria and call light use. Call light in reach.

## 2022-01-13 NOTE — H&P
History and Physical    Patient:  Daniela Dockery  MRN: [id number]  YOB: 1995    CHIEF COMPLAINT:  Left varicocele    HISTORY OF PRESENT ILLNESS:   The patient is a 32 y.o. male who presents with as above, here for surgery    Patient's old records, notes and chart reviewed and summarized above. Past Medical History:    History reviewed. No pertinent past medical history. Past Surgical History:    Past Surgical History:   Procedure Laterality Date    WISDOM TOOTH EXTRACTION      as teenager      Medications Prior to Admission:    Prior to Admission medications    Not on File       Allergies:  Milk-related compounds    Social History:    Social History     Socioeconomic History    Marital status: Single     Spouse name: Not on file    Number of children: Not on file    Years of education: Not on file    Highest education level: Not on file   Occupational History    Not on file   Tobacco Use    Smoking status: Current Some Day Smoker     Types: Cigars    Smokeless tobacco: Never Used    Tobacco comment: social   Substance and Sexual Activity    Alcohol use: Not Currently    Drug use: Never    Sexual activity: Not on file   Other Topics Concern    Not on file   Social History Narrative    Not on file     Social Determinants of Health     Financial Resource Strain: Low Risk     Difficulty of Paying Living Expenses: Not hard at all   Food Insecurity: No Food Insecurity    Worried About [facility] in the Last Year: Never true    [facility] in the Last Year: Never true   Transportation Needs:     Lack of Transportation (Medical): Not on file    Lack of Transportation (Non-Medical):  Not on file   Physical Activity:     Days of Exercise per Week: Not on file    Minutes of Exercise per Session: Not on file   Stress:     Feeling of Stress : Not on file   Social Connections:     Frequency of Communication with Friends and Family: Not on file    Frequency of Social Gatherings with Friends and Family: Not on file    Attends Orthodox Services: Not on file    Active Member of Clubs or Organizations: Not on file    Attends Club or Organization Meetings: Not on file    Marital Status: Not on file   Intimate Partner Violence:     Fear of Current or Ex-Partner: Not on file    Emotionally Abused: Not on file    Physically Abused: Not on file    Sexually Abused: Not on file   Housing Stability:     Unable to Pay for Housing in the Last Year: Not on file    Number of [place] in the Last Year: Not on file    Unstable Housing in the Last Year: Not on file       Family History:  History reviewed. No pertinent family history. REVIEW OF SYSTEMS:  Constitutional: negative  Eyes: negative  Respiratory: negative  Cardiovascular: negative  Gastrointestinal: negative  Genitourinary: see HPI  Musculoskeletal: negative  Skin: negative   Neurological: negative  Hematological/Lymphatic: negative  Psychological: negative    Physical Exam:      Patient Vitals for the past 24 hrs:   BP Temp Temp src Pulse Resp SpO2 Height Weight   01/13/22 0618 -- -- -- -- -- -- 6' 1\" (1.854 m) 187 lb 6.4 oz (85 kg)   01/13/22 0616 126/70 98.3 °F (36.8 °C) Temporal 81 16 98 % -- --     Constitutional: Patient in no acute distress; Neuro: alert and oriented to person place and time. Psych: Mood and affect normal.  Skin: Normal  Lungs: Respiratory effort normal, CTA  Cardiovascular:  Normal peripheral pulses; no murmur  Abdomen: Soft, non-tender, non-distended with no CVA, flank pain, hepatosplenomegaly or hernia. Kidneys normal.  Bladder non-tender and not distended. LABS:   No results for input(s): WBC, HGB, HCT, MCV, PLT in the last 72 hours. No results for input(s): NA, K, CL, CO2, PHOS, BUN, CREATININE, CA in the last 72 hours.   No results found for: PSA        Urinalysis: No results for input(s): COLORU, PHUR, LABCAST, WBCUA, RBCUA, MUCUS, TRICHOMONAS, YEAST, BACTERIA, CLARITYU, Rebecca Ramsey, UROBILINOGENAngel Early in the last 72 hours. Invalid input(s): NITRATE, GLUCOSEUKETONESUAMORPHOUS     -----------------------------------------------------------------      Assessment and Plan   Impression:    Patient Active Problem List   Diagnosis    Mononucleosis       Plan:   Risks: I discussed all the risks, benefits, alternatives and possible complications or surgery as well as expectations and post-op recovery.       Consent obtained; LEFT VARICOCELECTOMY in OR today    Ra Luna M.D, MD  7:42 AM 1/13/2022

## 2022-01-13 NOTE — OP NOTE
Dr. Mila Head MD           DATE:  1/13/2021     SURGEON:   Dawn Block MD        PREOPERATIVE DIAGNOSIS:  LEFT VARICOCELE     POSTOPERATIVE DIAGNOSIS:  same     PROCEDURE PERFORMED:  LEFT SUB-INGUINAL VARICOLECTOMY      ANESTHESIA:  General     COMPLICATIONS:  None     ESTIMATED BLOOD LOSS:  Minimal, 10 cc     SPECIMENS:  None     DRAINS:  None     INDICATIONS FOR PROCEDURE:  The patient is a 32 y.o. male who initially presents for LEFT Varicocelectomy. Risk, benefits, and alternatives were discussed with the patient and he elected to proceed. Informed consent was obtained. DETAILS OF THE PROCEDURE:  The patient was taken to the operating room and placed on the operating room table. General anesthesia was induced appropriately and preoperative antibiotics consisting of Ancef 2 g IV were administered. EPC cuffs were placed and confirmed to be working. The patient was prepped and draped in the usual sterile fashion. A surgical timeout indicating correct patient, procedure, and laterality was performed. We began by identifying the patient's external ring and delineating a 4 cm subinguinal incision on the LEFT side. We incised this with a #15 blade. Dissection was carried down through the subcutaneous tissue using Bovie electrocautery and blunt dissection was used to delineate the cord structures. We then brought the cord structures to the level of the skin and passed 1/2 inch Penrose drain underneath the cord to exclude it in the field. We then proceeded to carefully dissect external spermatic fascia and cremasteric fascia off of the cord. The vas was identified posteriorly. We identified 3-4 large varix which were thought to be contributing to the patient's varicocele. These were isolated and ligated using 2-0 silk suture. Testicular artery and some small to medium sized lymphatics were preserved. Hemostasis was achieved.   We did explore the rest of the cord to see if there were any other significant varices and we found none. Basal vessels were left intact. Once we were satisfied, we elected to conclude the procedure. We  we placed the cord back into anatomic position in straight fashion. We then administered local anesthesia along the cord. The subcutaneous tissue was then reapproximated using 2-0 Vicryl interrupted suture. The skin was anesthetized using local anesthetic. We then closed the skin in a running fashion using 4-0 Monocryl suture and Dermabond skin glue was applied. Dry dressing and scrotal support were then applied and the procedure was terminated. The patient tolerated the procedure without complication and was taken to PACU in good and stable condition. DISPOSITION:  The patient will be discharged from the postoperative recovery area and will follow-up in 6 weeks for postop check.     Follow up in 4-6 weeks with LISBETH

## 2022-01-13 NOTE — PROGRESS NOTES
0844  Pt. Unresponsive on adm. to pacu. LMA in place. Groin dressing intact and dry. Mesh underwear intact. 0845  Pt. Reacting. LMA removed. 0850  Pt. Awake and oriented. Pt. Denies pain. 0900  Pt. Repositioned per self. 9959  pacu criteria met. Transfer to [facility].

## 2022-03-02 ENCOUNTER — OFFICE VISIT (OUTPATIENT)
Dept: UROLOGY | Age: 27
End: 2022-03-02

## 2022-03-02 VITALS
BODY MASS INDEX: 24.78 KG/M2 | DIASTOLIC BLOOD PRESSURE: 62 MMHG | WEIGHT: 187 LBS | SYSTOLIC BLOOD PRESSURE: 106 MMHG | HEIGHT: 73 IN

## 2022-03-02 DIAGNOSIS — I86.1 VARICOCELE: Primary | ICD-10-CM

## 2022-03-02 PROCEDURE — 99024 POSTOP FOLLOW-UP VISIT: CPT | Performed by: NURSE PRACTITIONER

## 2022-03-02 RX ORDER — SULFAMETHOXAZOLE AND TRIMETHOPRIM 800; 160 MG/1; MG/1
1 TABLET ORAL 2 TIMES DAILY
Qty: 10 TABLET | Refills: 0 | Status: SHIPPED | OUTPATIENT
Start: 2022-03-02 | End: 2022-03-07

## 2022-03-02 ASSESSMENT — ENCOUNTER SYMPTOMS
VOMITING: 0
NAUSEA: 0
BACK PAIN: 0
ABDOMINAL PAIN: 0

## 2022-03-02 NOTE — PROGRESS NOTES
[address] [address] [address]  Dept: [phone]  Loc: [phone]    Visit Date: 3/2/2022        HPI:     Timur Duffy is a 32 y.o. male who presents today for:  Chief Complaint   Patient presents with    Post-Op Check     varicoceletomy-no urinary sx per pt        HPI   Pt seen in postop follow up. Pt has a hx of large left varicocele with atrophy of left testicle and right epididymal cyst.  He underwent left subinguinal varicocelectomy by Dr. Rachael Celestin 1/13/22. Notes a pea-sized area on scrotum of swelling and tenderness that opened up yesterday and drained some pus-like fluid. Today swelling improved. No further drainage. Reports postop incision without drainage, redness, pain. Denies fever/chills. Urinating without difficulty. No current outpatient medications on file. No current facility-administered medications for this visit. Past Medical History  [facility]  has a past medical history of Varicocele. Past Surgical History  The patient  has a past surgical history that includes [place] tooth extraction and Varicocelectomy (Left, 1/13/2022). Family History  This patient's family history is not on file. Social History  [facility]  reports that he has been smoking cigars. He has never used smokeless tobacco. He reports previous alcohol use. He reports that he does not use drugs. Subjective:      Review of Systems   Constitutional: Negative for activity change, appetite change, chills, diaphoresis, fatigue, fever and unexpected weight change. Gastrointestinal: Negative for abdominal pain, nausea and vomiting. Genitourinary: Negative for decreased urine volume, difficulty urinating, dysuria, flank pain, frequency, hematuria and urgency. Musculoskeletal: Negative for back pain.        Objective:   /62   Ht 6' 1\" (1.854 m)   Wt 187 lb (84.8 kg)   BMI 24.67 kg/m² Physical Exam  Vitals reviewed. Constitutional:       General: He is not in acute distress. Appearance: Normal appearance. He is well-developed. He is not ill-appearing or diaphoretic. HENT:      Head: Normocephalic and atraumatic. Right Ear: External ear normal.      Left Ear: External ear normal.      Nose: Nose normal.      Mouth/Throat:      Mouth: Mucous membranes are moist.   Eyes:      General: No scleral icterus. Right eye: No discharge. Left eye: No discharge. Neck:      Vascular: No JVD. Trachea: No tracheal deviation. Pulmonary:      Effort: Pulmonary effort is normal. No respiratory distress. Abdominal:      General: There is no distension. Tenderness: There is no abdominal tenderness. There is no right CVA tenderness or left CVA tenderness. Genitourinary:     Comments: Normal circumcised penis. Incision with edges well approximated without erythema or drainage. R scrotum with pea-sized area of folliculitis. Musculoskeletal:         General: Normal range of motion. Skin:     General: Skin is warm and dry. Neurological:      Mental Status: He is alert and oriented to person, place, and time. Mental status is at baseline. Psychiatric:         Mood and Affect: Mood normal.         Behavior: Behavior normal.         Thought Content: Thought content normal.       POC  No results found for this visit on 03/02/22. Patients recent PSA values are as follows  No results found for: PSA, PSADIA     Recent BUN/Creatinine:  Lab Results   Component Value Date    BUN 12 12/30/2021    CREATININE 1.1 12/30/2021       Radiology  The patient has had a Scrotal Ultrasound which I have independently reviewed along with its accompanying report. The study demonstrates left varicocele, R spermatocele.     patient  Narrative   PROCEDURE: US SCROTUM AND TESTICLES       CLINICAL INFORMATION: Mass of right testicle .       COMPARISON: No prior study.       TECHNIQUE: Grayscale and color Doppler sonographic imaging of the scrotum was performed in the longitudinal and transverse planes.       FINDINGS:        Findings           Right Testicle- 4.3 x 2.4 x 1.9 cm   Right Epididymis- 0.8 x 1.7 x 1.2 cm   Left Testicle - 3.7 x 2.1 x 1.8 cm    Left Epididymis - 1.1 x 1.5 x 1.5 cm       On the right side, the testis has normal echogenicity There is no testicular mass. Jillene Bergamo is normal color flow and Doppler waveforms. There is no hydrocele. There is a 7.4 x 7.3 cm echogenic lesion in the body of the epididymis which represents the    palpable abnormality. This may be secondary to chronic inflammatory disease. .       On the left, the testicle has normal echogenicity. There is no testicular mass. Jillene Bergamo is normal color flow and Doppler waveforms. There is a varicocele present. The epididymis is normal.           Impression       1. Normal right and left testes. 2. 7.4 x 7.3 mm echogenic lesion in the body of the epididymis which represents the palpable abnormality. This may be secondary to chronic inflammatory disease. 3. Varicocele on the left side. 4. Otherwise negative ultrasound scan of the scrotum.               Assessment:   Left varicocele  R speramatocele  Folliculitis  Plan:     Pt doing well postoperatively. Continue scrotal support with any physical activity. Findings of folliculitis to R scrotum. Healing. No active drainage. Start Bactrim. Pt to call if any worsening in symptoms. Discussed f/u in a few months vs PRN and call if symptoms worsen or fail to resolve. Pt prefers to f/u prn.

## 2022-07-29 ENCOUNTER — OFFICE VISIT (OUTPATIENT)
Dept: FAMILY MEDICINE CLINIC | Age: 27
End: 2022-07-29
Payer: COMMERCIAL

## 2022-07-29 VITALS
WEIGHT: 199.8 LBS | OXYGEN SATURATION: 97 % | TEMPERATURE: 97 F | BODY MASS INDEX: 26.48 KG/M2 | RESPIRATION RATE: 16 BRPM | HEIGHT: 73 IN | SYSTOLIC BLOOD PRESSURE: 120 MMHG | HEART RATE: 78 BPM | DIASTOLIC BLOOD PRESSURE: 78 MMHG

## 2022-07-29 DIAGNOSIS — S70.361A TICK BITE OF RIGHT THIGH, INITIAL ENCOUNTER: Primary | ICD-10-CM
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
## 2022-07-29 PROCEDURE — 99214 OFFICE O/P EST MOD 30 MIN: CPT | Performed by: NURSE PRACTITIONER

## 2022-07-29 RX ORDER — DOXYCYCLINE HYCLATE 100 MG
100 TABLET ORAL 2 TIMES DAILY
Qty: 20 TABLET | Refills: 0 | Status: SHIPPED | OUTPATIENT
Start: 2022-07-29 | End: 2022-08-08

## 2022-07-29 ASSESSMENT — PATIENT HEALTH QUESTIONNAIRE - PHQ9
SUM OF ALL RESPONSES TO PHQ9 QUESTIONS 1 & 2: 0
2. FEELING DOWN, DEPRESSED OR HOPELESS: 0
SUM OF ALL RESPONSES TO PHQ QUESTIONS 1-9: 0
1. LITTLE INTEREST OR PLEASURE IN DOING THINGS: 0

## 2022-08-10 ENCOUNTER — OFFICE VISIT (OUTPATIENT)
Dept: FAMILY MEDICINE CLINIC | Age: 27
End: 2022-08-10

## 2022-08-10 DIAGNOSIS — R05.9 COUGH: Primary | ICD-10-CM

## 2022-08-10 LAB
Lab: ABNORMAL
QC PASS/FAIL: ABNORMAL
SARS-COV-2 RDRP RESP QL NAA+PROBE: POSITIVE

## 2022-08-10 PROCEDURE — 87635 SARS-COV-2 COVID-19 AMP PRB: CPT | Performed by: NURSE PRACTITIONER

## 2022-08-10 PROCEDURE — 99999 PR OFFICE/OUTPT VISIT,PROCEDURE ONLY: CPT | Performed by: NURSE PRACTITIONER

## 2022-08-10 NOTE — LETTER
[facility]  [address] [address]  Phone: [phone]  Fax: [address], APRN - CNP        August 10, 2022     Patient: Adele Pedraza   YOB: 1995   Date of Visit: 8/10/2022       To Whom It May Concern: It is my medical opinion that Jamilah Lewis should remain out of work until 8-. If you have any questions or concerns, please don't hesitate to call.     Sincerely,        CONCEPCION Carey - CNP

## (undated) DEVICE — GLOVE ORANGE PI 7 1/2   MSG9075

## (undated) DEVICE — PENCIL SMK EVAC ALL IN 1 DSGN ENH VISIBILITY IMPROVED AIR

## (undated) DEVICE — Z DISCONTINUED BY MEDLINE USE 2711682 TRAY SKIN PREP DRY W/ PREM GLV

## (undated) DEVICE — SHEET, T, LAPAROTOMY, STERILE: Brand: MEDLINE

## (undated) DEVICE — SOLUTION IV IRRIG POUR BRL 0.9% SODIUM CHL 2F7124

## (undated) DEVICE — PACK PROCEDURE SURG SET UP SRMC

## (undated) DEVICE — SUTURE PERMAHAND SZ 3-0 L18IN NONABSORBABLE BLK SILK BRAID A184H

## (undated) DEVICE — ADHESIVE SKIN CLSR 0.7ML TOP DERMBND ADV

## (undated) DEVICE — GOWN,SIRUS,NON REINFRCD,LARGE,SET IN SL: Brand: MEDLINE

## (undated) DEVICE — GAUZE,SPONGE,8"X4",12PLY,XRAY,STRL,LF: Brand: MEDLINE

## (undated) DEVICE — SOLUTION SCRB 4OZ 4% CHG H2O AIDED FOR PREOPERATIVE SKIN

## (undated) DEVICE — PACK-MAJOR

## (undated) DEVICE — SUTURE VCRL SZ 3-0 L27IN ABSRB UD FS-2 L19MM 1/2 CIR J423H